# Patient Record
Sex: FEMALE | Race: WHITE | NOT HISPANIC OR LATINO | Employment: UNEMPLOYED | URBAN - METROPOLITAN AREA
[De-identification: names, ages, dates, MRNs, and addresses within clinical notes are randomized per-mention and may not be internally consistent; named-entity substitution may affect disease eponyms.]

---

## 2018-10-02 ENCOUNTER — OFFICE VISIT (OUTPATIENT)
Dept: RHEUMATOLOGY | Facility: CLINIC | Age: 53
End: 2018-10-02
Payer: COMMERCIAL

## 2018-10-02 VITALS — DIASTOLIC BLOOD PRESSURE: 78 MMHG | WEIGHT: 282.88 LBS | SYSTOLIC BLOOD PRESSURE: 125 MMHG | HEART RATE: 94 BPM

## 2018-10-02 DIAGNOSIS — F32.A ANXIETY AND DEPRESSION: ICD-10-CM

## 2018-10-02 DIAGNOSIS — R76.8 POSITIVE ANA (ANTINUCLEAR ANTIBODY): Primary | ICD-10-CM

## 2018-10-02 DIAGNOSIS — E66.01 MORBID OBESITY: ICD-10-CM

## 2018-10-02 DIAGNOSIS — F41.9 ANXIETY AND DEPRESSION: ICD-10-CM

## 2018-10-02 LAB — CK SERPL-CCNC: 62 IU/L (ref 13–135)

## 2018-10-02 PROCEDURE — 99204 OFFICE O/P NEW MOD 45 MIN: CPT | Mod: ,,, | Performed by: INTERNAL MEDICINE

## 2018-10-02 RX ORDER — TOPIRAMATE SPINKLE 25 MG/1
25 CAPSULE ORAL
COMMUNITY
End: 2018-10-02

## 2018-10-02 RX ORDER — VITAMIN B COMPLEX
1 CAPSULE ORAL DAILY
COMMUNITY

## 2018-10-02 RX ORDER — OXYCODONE AND ACETAMINOPHEN 5; 325 MG/1; MG/1
TABLET ORAL
Refills: 0 | COMMUNITY
Start: 2018-09-27

## 2018-10-02 RX ORDER — FLUOXETINE HYDROCHLORIDE 20 MG/1
CAPSULE ORAL
Refills: 2 | COMMUNITY
Start: 2018-08-21

## 2018-10-02 RX ORDER — EPINEPHRINE 0.22MG
1 AEROSOL WITH ADAPTER (ML) INHALATION
COMMUNITY

## 2018-10-02 RX ORDER — ALBUTEROL SULFATE 90 UG/1
2 AEROSOL, METERED RESPIRATORY (INHALATION)
COMMUNITY

## 2018-10-02 RX ORDER — VALACYCLOVIR HYDROCHLORIDE 500 MG/1
TABLET, FILM COATED ORAL
Refills: 5 | COMMUNITY
Start: 2018-08-28

## 2018-10-02 RX ORDER — BUSPIRONE HYDROCHLORIDE 10 MG/1
10 TABLET ORAL
COMMUNITY

## 2018-10-02 RX ORDER — OMEGA-3-ACID ETHYL ESTERS 1 G/1
2 CAPSULE, LIQUID FILLED ORAL 2 TIMES DAILY
COMMUNITY

## 2018-10-02 RX ORDER — ASPIRIN 325 MG
325 TABLET ORAL DAILY
COMMUNITY

## 2018-10-02 RX ORDER — LANSOPRAZOLE 30 MG/1
30 CAPSULE, DELAYED RELEASE ORAL
COMMUNITY

## 2018-10-02 RX ORDER — IBUPROFEN 100 MG/5ML
1000 SUSPENSION, ORAL (FINAL DOSE FORM) ORAL
COMMUNITY

## 2018-10-02 RX ORDER — CHOLECALCIFEROL (VITAMIN D3) 25 MCG
1000 TABLET ORAL
COMMUNITY

## 2018-10-02 RX ORDER — CHLORTHALIDONE 25 MG/1
TABLET ORAL
Refills: 1 | COMMUNITY
Start: 2018-08-28

## 2018-10-02 RX ORDER — LOSARTAN POTASSIUM 50 MG/1
TABLET ORAL
Refills: 1 | COMMUNITY
Start: 2018-09-07

## 2018-10-02 RX ORDER — LORAZEPAM 0.5 MG/1
2 TABLET ORAL
COMMUNITY

## 2018-10-02 NOTE — PATIENT INSTRUCTIONS
Antiphospholipid Antibody  Does this test have other names?  APA, lupus anticoagulant, anticardiolipin antibodies  What is this test?  This blood test checks for antiphospholipid antibodies. These may be found in people with abnormal blood clots or autoimmune diseases.  Your immune system usually creates antibodies in response to an infection or foreign invaders like bacteria. Antiphospholipid antibodies are usually made when your immune system mistakes part of your own body for a harmful substance. In this case, the antibodies seem to be reacting to phospholipids. Phospholipids are a normal part of your blood vessels.  People who have abnormal blood clots, repeated miscarriages, or autoimmune diseases like lupus and multiple sclerosis often have antiphospholipid antibodies. People with cancer may also have these antibodies. The antibodies often fade away when the cancer is treated.  The two most common types of antiphospholipid antibodies are lupus anticoagulant and anticardiolipin antibodies. Testing for lupus anticoagulant often uses a test like the Shimon viper venom time (RVVT) or kaolin clotting time. RVVT measures how long it takes a type of viper venom to trigger a blood clot. Kaolin clotting time is used to diagnose clotting disorders and find the lupus anticoagulant. Measuring anticardiolipin antibodies is done by looking for antibodies against the cardiolipin molecule.    Why do I need this test?  You may need this test if you:  · Have repeated miscarriages  · Get abnormal blood clots that could lead to heart attack or stroke  · Have antiphospholipid antibody syndrome. This is a group of symptoms that includes miscarriages, a platelet deficiency, and abnormal blood clots.  · Have lupus or cancer  What other tests might I have along with this test?  Your health care provider may also order a partial thromboplastin time, or PTT. This may help find out what is causing a blood clot or bleeding disorder.  You may also have a dilute prothrombin test. This helps measure how long it takes a clot to form.   What do my test results mean?  Many things may affect your lab test results. These include the method each lab uses to do the test. Even if your test results are different from the normal value, you may not have a problem. To learn what the results mean for you, talk with your health care provider.  A negative result means you dont have these antibodies. Low to moderate results may mean the antibodies are there because of a recent health problem or a drug you have taken. High levels of this antibody may mean you have a higher risk for blood clots. Your health care provider cannot predict when a clot may happen. Your doctor may order a second test in about 12 weeks to confirm the results.  A positive result does not mean you need treatment. If you have antiphospholipid syndrome, your provider may suggest treatment that includes warfarin, an anti-clotting medicine. Your provider will tell you what the results mean in light of your overall health.   How is this test done?  The test requires a blood sample, which is drawn through a needle from a vein in your arm.  Does this test pose any risks?  Taking a blood sample with a needle carries risks that include bleeding, infection, bruising, or feeling dizzy. When the needle pricks your arm, you may feel a slight stinging sensation or pain. Afterward, the site may be slightly sore.   What might affect my test results?  A test done for syphilis can cause a false-positive antiphospholipid antibody test result if done at the same time. Thats because the substances used to test for syphilis have phospholipids in them. Your health care provider may order a second test to confirm the results.  Some drugs such as quinidine, procainamide, phenytoin, and penicillin may raise antibody levels. Recent viral infections such as HIV can also affect the results.  How do I get ready for  this test?  You don't need to prepare for this test.  But be sure your doctor knows about all medicines, herbs, vitamins, and supplements you are taking. This includes medicines that don't need a prescription and any illicit drugs you may use.   © 0213-9432 [employer]. [address] [place] [address]. All rights reserved. This information is not intended as a substitute for professional medical care. Always follow your healthcare professional's instructions.

## 2018-10-02 NOTE — PROGRESS NOTES
"      [facility] RHEUMATOLOGY           New patient visit    Notes dictated via Dragon to EPIC. Please forgive any unintentional errors.  Subjective:       Patient ID:   NAME: Korina Juan : 1965     53 y.o. female    Referring Doc: No ref. 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 The test was reported at 1:80 dilutions in a nucleolar pattern. It was not profiled. The test was performed as part of a general screening for diffuse joint and muscle pain. The patient states this has been going on for many years but is becoming progressively more difficult to live with. He has had pain in her hands, particularly in her thumbs most recently.These however have not been red, hot, and/or swollen. She does not describe discrete morning stiffness but rather has significant gelling. She is not on NSAIDs, in part because she has reflux. (Her last EGD was 20 years ago and she has developed progressive dysphagia in the last couple of years).  She denies any hematemesis, hematochezia, or melena.  She has had issues with hypertension, reactive airway disease, and anxiety-depression. It appears to have been termed, at least once, bipolar disorder. She is under the care of a "psychiatric nurse practitioner." she has had morbid obesity for many years and has battled with diet; the like but has not made any significant progress. Her primary provider has discussed bariatric surgery with her that she feels it is out of her reach financially. She is 53 years old and her youngest child is now 5 or 6 years old.    The rheumatologic review of systems is negative for hair loss, facial skin rash, significant sicca symptoms, oral ulcerations, pleuropericarditis, renal disease, anemia, arthritis, Raynaud's, and seizures. Her OB history is remarkable for  A3 L3. Two the fetal losses were " unplanned and appear to have been early second trimester;  the third was an elective termination of pregnancy.      ROS:   GEN:      no fever, night sweats or weight loss  SKIN:     no rashes , erythema, bruising, or swelling, no Raynauds, no photosensitivity  HEENT:  no acute changes in vision, no mouth ulcers, no sicca symptoms, no scalp tenderness, jaw claudication.  CV:        no CP, PND, + GAVIRIA, no orthopnea, no palpitations  PULM:    + SOB, no cough, hemoptysis, sputum or pleuritic pain  GI:          No dysphagia or GERD, no abdominal pain, nausea, vomiting, constipation, diarrhea, melanotic stools, BRBPR, or hematemesis.  :       no hematuria, dysuria  NEURO: +/-  paresthesias, occ. headaches, no acute visual disturbances, muscle weakness  MUSCULOSKELETAL:  Myalgias and arthralgias without any red, hot, and/or swollen joints  PSYCH: + insomnia, + snoring, ++ significant depression &  anxiety    Medications:    Current Outpatient Medications:     albuterol (PROVENTIL/VENTOLIN HFA) 90 mcg/actuation inhaler, Inhale 2 puffs into the lungs., Disp: , Rfl:     ascorbic acid, vitamin C, (VITAMIN C) 1000 MG tablet, Take 1,000 mg by mouth., Disp: , Rfl:     aspirin 325 MG tablet, Take 325 mg by mouth once daily., Disp: , Rfl:     b complex vitamins capsule, Take 1 capsule by mouth once daily., Disp: , Rfl:     busPIRone (BUSPAR) 10 MG tablet, Take 10 mg by mouth., Disp: , Rfl:     chlorthalidone (HYGROTEN) 25 MG Tab, TK 1/2 T PO QD, Disp: , Rfl: 1    coenzyme Q10 100 mg capsule, Take 1 capsule by mouth., Disp: , Rfl:     FLUoxetine (PROZAC) 20 MG capsule, TK 3 CS PO ONCE D, Disp: , Rfl: 2    lansoprazole (PREVACID) 30 MG capsule, Take 30 mg by mouth., Disp: , Rfl:     LORazepam (ATIVAN) 0.5 MG tablet, Take 2 tablets by mouth., Disp: , Rfl:     losartan (COZAAR) 50 MG tablet, TK 1 T PO QD, Disp: , Rfl: 1    LYSINE ORAL, Take by mouth., Disp: , Rfl:     omega-3 acid ethyl esters (LOVAZA) 1 gram capsule,  Take 2 g by mouth 2 (two) times daily., Disp: , Rfl:     oxyCODONE-acetaminophen (PERCOCET) 5-325 mg per tablet, TK 1 T PO Q 4 H PRN P, Disp: , Rfl: 0    ranitidine (ZANTAC) 150 MG tablet, Take 150 mg by mouth 2 (two) times daily., Disp: , Rfl:     valACYclovir (VALTREX) 500 MG tablet, TK 1 T PO D, Disp: , Rfl: 5    vitamin D (VITAMIN D3) 1000 units Tab, Take 1,000 Units by mouth., Disp: , Rfl:     vitamin E 100 UNIT capsule, Take 100 Units by mouth once daily., Disp: , Rfl:     FAMILY HISTORY: negative for Connective Tissue Disease        Review of patient's allergies indicates:   Allergen Reactions    Egg derived     Levofloxacin      Other reaction(s): Other (See Comments)  hives    Milk containing products     Penicillins Itching    Yeast     Cephalexin Rash             Objective:     Vitals:  Blood pressure 125/78, pulse 94, weight 128.3 kg (282 lb 14.4 oz).    Physical Examination:   GEN:     wn/wd female in no apparent distress  SKIN:    no rashes, no sclerodactyly, no Raynaud's, no periungual erythema  HEAD:   no alopecia, no scalp tenderness, no temporal artery tenderness or induration.  EYES:   no pallor, no icterus, PERRLA  ENT:     no thrush, no mucosal dryness or ulcerations, adequate oral hygiene & dentition.  NECK:  supple x 6, no masses, no thyromegaly, no lymphadenopathy.  CV:        S1 and S2 regular, no murmurs, gallop or rubs  CHEST: Normal respiratory effort;  normal breath sounds/no adventitious sounds. No signs of consolidation.  ABD:      non-tender and non-distended; soft; normal bowel sounds; no rebound, guarding, or tenderness. No hepatosplenomegaly.  Musculoskeletal:  No evidence of any active inflammatory arthritis. No significant degenerative disease  EXTREM: no clubbing, cyanosis or edema. normal pulses.  NEURO:  grossly intact; motor/sensory WNL; no tremors  PSYCH:  normal mood, affect and behavior            Labs:   No results found for: WBC, HGB, HCT, MCV, PLTCMP@  No  results found for: NA, K, CL, CO2, GLU, BUN, CREATININE, CALCIUM, PROT, ALBUMIN, BILITOT, ALKPHOS, AST, ALT, CPK, CRP, NELIA, RF, URICACID      Radiology/Diagnostic Studies:    none    Assessment/Discussion/Plan:   53 y.o. female with  Low titer positive NELIA in a clinical setting of no significant history or physical findings suggestive of autoimmune rheumatologic disease      PLAN:  I have repeated the NELIA and I have performed an anti-SSA. If these are both negative, no further rheumatologic workup is required. However, looking at her OB history, I thought it worthwhile to assess anticardiolipin/lupus anticoagulant antibodies.  Naturally, if these are positive, she will require at least low-level anticoagulation (i.e. Aspirin).     I spent a good deal of time discussing with her the very significant role that her morbid obesity plays and virtually all of her complaints. Certainly, her anxiety-depressive disorder would benefit.More importantly, I discussed the decreasing window of opportunity to do something about this. I have requested that she and her  consider how they might be able to make this happen. I do not know of any bariatric surgeons at [facility] or at [facility], though I do know that [facility] does quite a few bariatric procedures and anecdotally, these folks often seem to do well.    I do believe that she should be seen for follow-up EGD (it was Dr. العلي who she saw last).     Lastly, I have discussed seeing a professional mental health provider. I have referred her to Dr. Jessi Garcia, PhD in whom I have a great deal of confidence.    Unless the blood testing is positive, there is no need for the patient to follow-up with me, though she is always welcome to do so.    RTC: At your re-referral or at her request      Electronically signed by Stephen Boone MD

## 2018-10-04 LAB
DRVVT CONFIRM: 44.3 SEC (ref 0–47)
LUPUS ANTICOAGULANT INTERPRETATION: NORMAL
PTT-LA MIX: 36.9 SEC (ref 0–51.9)

## 2018-10-05 LAB
ANA SER-ACNC: NEGATIVE
CARDIOLIPIN IGA SER IA-ACNC: <9 APL U/ML (ref 0–11)
CARDIOLIPIN IGG SER IA-ACNC: <9 GPL U/ML (ref 0–14)
CARDIOLIPIN IGM SER IA-ACNC: <9 MPL U/ML (ref 0–12)

## 2019-11-04 DIAGNOSIS — R74.8 ACID PHOSPHATASE ELEVATED: Primary | ICD-10-CM

## 2019-12-11 ENCOUNTER — HOSPITAL ENCOUNTER (OUTPATIENT)
Dept: RADIOLOGY | Facility: HOSPITAL | Age: 54
Discharge: HOME | End: 2019-12-11
Attending: FAMILY MEDICINE
Payer: COMMERCIAL

## 2019-12-11 DIAGNOSIS — R74.8 ACID PHOSPHATASE ELEVATED: ICD-10-CM

## 2019-12-11 PROCEDURE — 76700 US EXAM ABDOM COMPLETE: CPT | Mod: TC,PO

## 2021-10-01 DIAGNOSIS — M79.601 RIGHT UPPER LIMB PAIN: Primary | ICD-10-CM

## 2022-11-15 DIAGNOSIS — J45.21 MILD INTERMITTENT ASTHMA WITH ACUTE EXACERBATION: Primary | ICD-10-CM

## 2022-11-16 ENCOUNTER — HOSPITAL ENCOUNTER (OUTPATIENT)
Dept: RADIOLOGY | Facility: HOSPITAL | Age: 57
Discharge: HOME | End: 2022-11-16
Attending: FAMILY MEDICINE
Payer: COMMERCIAL

## 2022-11-16 DIAGNOSIS — J45.21 MILD INTERMITTENT ASTHMA WITH ACUTE EXACERBATION: ICD-10-CM

## 2022-11-16 DIAGNOSIS — Z12.31 SCREENING MAMMOGRAM FOR HIGH-RISK PATIENT: Primary | ICD-10-CM

## 2022-11-16 PROCEDURE — 71046 X-RAY EXAM CHEST 2 VIEWS: CPT | Mod: TC,PO

## 2022-12-06 ENCOUNTER — HOSPITAL ENCOUNTER (OUTPATIENT)
Dept: RADIOLOGY | Facility: HOSPITAL | Age: 57
Discharge: HOME | End: 2022-12-06
Attending: OBSTETRICS & GYNECOLOGY
Payer: COMMERCIAL

## 2022-12-06 DIAGNOSIS — Z12.31 SCREENING MAMMOGRAM FOR HIGH-RISK PATIENT: ICD-10-CM

## 2022-12-06 PROCEDURE — 77067 SCR MAMMO BI INCL CAD: CPT | Mod: TC,PO

## 2022-12-06 PROCEDURE — 77063 BREAST TOMOSYNTHESIS BI: CPT | Mod: TC,PO

## 2023-05-17 DIAGNOSIS — R53.83 LOSS OF ENERGY: ICD-10-CM

## 2023-05-17 DIAGNOSIS — G47.19 EXCESSIVE DAYTIME SLEEPINESS: ICD-10-CM

## 2023-05-17 DIAGNOSIS — R06.83 SNORING: ICD-10-CM

## 2023-05-17 DIAGNOSIS — G47.33 OSA (OBSTRUCTIVE SLEEP APNEA): Primary | ICD-10-CM

## 2023-05-17 DIAGNOSIS — I10 HYPERTENSION: ICD-10-CM

## 2024-02-27 DIAGNOSIS — Z12.31 ENCOUNTER FOR SCREENING MAMMOGRAM FOR MALIGNANT NEOPLASM OF BREAST: Primary | ICD-10-CM

## 2024-03-05 ENCOUNTER — HOSPITAL ENCOUNTER (OUTPATIENT)
Dept: RADIOLOGY | Facility: HOSPITAL | Age: 59
Discharge: HOME | End: 2024-03-05
Attending: OBSTETRICS & GYNECOLOGY
Payer: COMMERCIAL

## 2024-03-05 DIAGNOSIS — Z12.31 ENCOUNTER FOR SCREENING MAMMOGRAM FOR MALIGNANT NEOPLASM OF BREAST: ICD-10-CM

## 2024-03-05 PROCEDURE — 77067 SCR MAMMO BI INCL CAD: CPT | Mod: TC,PO

## 2025-05-16 ENCOUNTER — HOSPITAL ENCOUNTER (OUTPATIENT)
Dept: RADIOLOGY | Facility: HOSPITAL | Age: 60
Discharge: HOME | End: 2025-05-16
Attending: OBSTETRICS & GYNECOLOGY
Payer: COMMERCIAL

## 2025-05-16 VITALS — WEIGHT: 282 LBS

## 2025-05-16 DIAGNOSIS — Z12.31 OTHER SCREENING MAMMOGRAM: ICD-10-CM

## 2025-05-16 PROCEDURE — 77063 BREAST TOMOSYNTHESIS BI: CPT | Mod: 26,,, | Performed by: RADIOLOGY

## 2025-05-16 PROCEDURE — 77067 SCR MAMMO BI INCL CAD: CPT | Mod: TC,PO

## 2025-05-16 PROCEDURE — 77067 SCR MAMMO BI INCL CAD: CPT | Mod: 26,,, | Performed by: RADIOLOGY
